# Patient Record
Sex: MALE | Race: WHITE
[De-identification: names, ages, dates, MRNs, and addresses within clinical notes are randomized per-mention and may not be internally consistent; named-entity substitution may affect disease eponyms.]

---

## 2020-10-22 ENCOUNTER — HOSPITAL ENCOUNTER (INPATIENT)
Dept: HOSPITAL 95 - ER | Age: 75
LOS: 2 days | Discharge: HOME | DRG: 242 | End: 2020-10-24
Attending: HOSPITALIST | Admitting: HOSPITALIST
Payer: OTHER GOVERNMENT

## 2020-10-22 VITALS — HEIGHT: 72.99 IN | BODY MASS INDEX: 34.19 KG/M2 | WEIGHT: 257.94 LBS

## 2020-10-22 DIAGNOSIS — E66.01: ICD-10-CM

## 2020-10-22 DIAGNOSIS — Z79.84: ICD-10-CM

## 2020-10-22 DIAGNOSIS — I50.31: ICD-10-CM

## 2020-10-22 DIAGNOSIS — Z96.651: ICD-10-CM

## 2020-10-22 DIAGNOSIS — G47.33: ICD-10-CM

## 2020-10-22 DIAGNOSIS — Z96.643: ICD-10-CM

## 2020-10-22 DIAGNOSIS — E78.5: ICD-10-CM

## 2020-10-22 DIAGNOSIS — E11.9: ICD-10-CM

## 2020-10-22 DIAGNOSIS — J44.9: ICD-10-CM

## 2020-10-22 DIAGNOSIS — I49.5: Primary | ICD-10-CM

## 2020-10-22 DIAGNOSIS — I11.0: ICD-10-CM

## 2020-10-22 DIAGNOSIS — I48.11: ICD-10-CM

## 2020-10-22 DIAGNOSIS — K21.9: ICD-10-CM

## 2020-10-22 LAB
ALBUMIN SERPL BCP-MCNC: 3.4 G/DL (ref 3.4–5)
ALBUMIN/GLOB SERPL: 1 {RATIO} (ref 0.8–1.8)
ALT SERPL W P-5'-P-CCNC: 31 U/L (ref 12–78)
ANION GAP SERPL CALCULATED.4IONS-SCNC: 5 MMOL/L (ref 6–16)
AST SERPL W P-5'-P-CCNC: 37 U/L (ref 12–37)
BASOPHILS # BLD AUTO: 0.06 K/MM3 (ref 0–0.23)
BASOPHILS NFR BLD AUTO: 1 % (ref 0–2)
BILIRUB SERPL-MCNC: 1 MG/DL (ref 0.1–1)
BUN SERPL-MCNC: 14 MG/DL (ref 8–24)
CALCIUM SERPL-MCNC: 8.7 MG/DL (ref 8.5–10.1)
CHLORIDE SERPL-SCNC: 115 MMOL/L (ref 98–108)
CO2 SERPL-SCNC: 25 MMOL/L (ref 21–32)
CREAT SERPL-MCNC: 0.85 MG/DL (ref 0.6–1.2)
DEPRECATED RDW RBC AUTO: 45.9 FL (ref 35.1–46.3)
EOSINOPHIL # BLD AUTO: 0.26 K/MM3 (ref 0–0.68)
EOSINOPHIL NFR BLD AUTO: 4 % (ref 0–6)
ERYTHROCYTE [DISTWIDTH] IN BLOOD BY AUTOMATED COUNT: 13.3 % (ref 11.7–14.2)
GLOBULIN SER CALC-MCNC: 3.4 G/DL (ref 2.2–4)
GLUCOSE SERPL-MCNC: 109 MG/DL (ref 70–99)
HCT VFR BLD AUTO: 44.9 % (ref 37–53)
HGB BLD-MCNC: 14.8 G/DL (ref 13.5–17.5)
IMM GRANULOCYTES # BLD AUTO: 0.02 K/MM3 (ref 0–0.1)
IMM GRANULOCYTES NFR BLD AUTO: 0 % (ref 0–1)
LYMPHOCYTES # BLD AUTO: 1.67 K/MM3 (ref 0.84–5.2)
LYMPHOCYTES NFR BLD AUTO: 23 % (ref 21–46)
MAGNESIUM SERPL-MCNC: 2.1 MG/DL (ref 1.6–2.4)
MCHC RBC AUTO-ENTMCNC: 33 G/DL (ref 31.5–36.5)
MCV RBC AUTO: 94 FL (ref 80–100)
MONOCYTES # BLD AUTO: 0.62 K/MM3 (ref 0.16–1.47)
MONOCYTES NFR BLD AUTO: 9 % (ref 4–13)
NEUTROPHILS # BLD AUTO: 4.64 K/MM3 (ref 1.96–9.15)
NEUTROPHILS NFR BLD AUTO: 64 % (ref 41–73)
NRBC # BLD AUTO: 0 K/MM3 (ref 0–0.02)
NRBC BLD AUTO-RTO: 0 /100 WBC (ref 0–0.2)
PLATELET # BLD AUTO: 199 K/MM3 (ref 150–400)
POTASSIUM SERPL-SCNC: 4.7 MMOL/L (ref 3.5–5.5)
PROT SERPL-MCNC: 6.8 G/DL (ref 6.4–8.2)
PROTHROMBIN TIME: 11 SEC (ref 9.7–11.5)
SODIUM SERPL-SCNC: 145 MMOL/L (ref 136–145)
TROPONIN I SERPL-MCNC: <0.015 NG/ML (ref 0–0.04)

## 2020-10-22 PROCEDURE — C1781 MESH (IMPLANTABLE): HCPCS

## 2020-10-22 PROCEDURE — A9270 NON-COVERED ITEM OR SERVICE: HCPCS

## 2020-10-22 PROCEDURE — U0003 INFECTIOUS AGENT DETECTION BY NUCLEIC ACID (DNA OR RNA); SEVERE ACUTE RESPIRATORY SYNDROME CORONAVIRUS 2 (SARS-COV-2) (CORONAVIRUS DISEASE [COVID-19]), AMPLIFIED PROBE TECHNIQUE, MAKING USE OF HIGH THROUGHPUT TECHNOLOGIES AS DESCRIBED BY CMS-2020-01-R: HCPCS

## 2020-10-22 PROCEDURE — C1898 LEAD, PMKR, OTHER THAN TRANS: HCPCS

## 2020-10-22 PROCEDURE — C1894 INTRO/SHEATH, NON-LASER: HCPCS

## 2020-10-22 PROCEDURE — C1785 PMKR, DUAL, RATE-RESP: HCPCS

## 2020-10-23 LAB
ANION GAP SERPL CALCULATED.4IONS-SCNC: 6 MMOL/L (ref 6–16)
BUN SERPL-MCNC: 17 MG/DL (ref 8–24)
CALCIUM SERPL-MCNC: 8.7 MG/DL (ref 8.5–10.1)
CHLORIDE SERPL-SCNC: 111 MMOL/L (ref 98–108)
CO2 SERPL-SCNC: 25 MMOL/L (ref 21–32)
CREAT SERPL-MCNC: 1.08 MG/DL (ref 0.6–1.2)
DEPRECATED RDW RBC AUTO: 44.8 FL (ref 35.1–46.3)
ERYTHROCYTE [DISTWIDTH] IN BLOOD BY AUTOMATED COUNT: 13.2 % (ref 11.7–14.2)
GLUCOSE SERPL-MCNC: 116 MG/DL (ref 70–99)
HCT VFR BLD AUTO: 42.7 % (ref 37–53)
HGB BLD-MCNC: 14.1 G/DL (ref 13.5–17.5)
MCHC RBC AUTO-ENTMCNC: 33 G/DL (ref 31.5–36.5)
MCV RBC AUTO: 92 FL (ref 80–100)
NRBC # BLD AUTO: 0 K/MM3 (ref 0–0.02)
NRBC BLD AUTO-RTO: 0 /100 WBC (ref 0–0.2)
PLATELET # BLD AUTO: 225 K/MM3 (ref 150–400)
POTASSIUM SERPL-SCNC: 3.9 MMOL/L (ref 3.5–5.5)
SODIUM SERPL-SCNC: 142 MMOL/L (ref 136–145)

## 2020-10-23 PROCEDURE — 02H63JZ INSERTION OF PACEMAKER LEAD INTO RIGHT ATRIUM, PERCUTANEOUS APPROACH: ICD-10-PCS | Performed by: INTERNAL MEDICINE

## 2020-10-23 PROCEDURE — 0JH806Z INSERTION OF PACEMAKER, DUAL CHAMBER INTO ABDOMEN SUBCUTANEOUS TISSUE AND FASCIA, OPEN APPROACH: ICD-10-PCS | Performed by: INTERNAL MEDICINE

## 2020-10-23 PROCEDURE — 02HK3JZ INSERTION OF PACEMAKER LEAD INTO RIGHT VENTRICLE, PERCUTANEOUS APPROACH: ICD-10-PCS | Performed by: INTERNAL MEDICINE

## 2020-10-23 NOTE — NUR
SHIFT SUMMARY:
 
PT RETURNED FROM [facility] AT APPROX 1600. DUAL CHAMBER PACEMAKER PLACED TO
RIGHT CHEST WALL, RATE OF 60 BPM, PRESSURE DRESSING IN PLACE AND C/D/I. PT
TOLERATES PROCEDURE WELL, MEDICATED PER EMAR W/TYLENOL FOR C/O HEADACHE
REPORTS IMPROVEMENT OF SYMPTOMS. PT CONSUMES 100% OF DINNER TRAY. VS HAVE BEEN
STABLE SO FAR. WILL CONTINUE TO MONITOR AND TREAT ACCORDINGLY UNTIL CHANGE OF
SHIFT.

## 2020-10-23 NOTE — NUR
SHIFT SUMMARY
PT A&O X 4; PLEASANT & COMPLIANT W/ CARE; WIFE AT BEDSIDE; VSS; AFLUTTER NOTED
ON TELE; HAS SYMPTOMATIC BRADYCARDIA EPISODES W/ HR AS LOW AS 25; PT STATES AT
TIMES HE DOES FEEL A "WARM FLUSH" OR "SOMETHING COME OVER ME"; DR. WU AT
BEDSIDE THIS AM; PT DENIES NEEDS; CALL LIGHT IN REACH; BED IN LOWEST POSITION;
WILL CONTINUE TO MONITOR CLOSELY UNTIL HAND OFF TO DAY SHIFT RN.

## 2020-10-23 NOTE — NUR
ASSUMED CARE OF PT, RECEIVED REPORT FROM [facility]. PT RESTING QUIETLY IN
BED WITH TV ON AND SPOUSE AT BEDSIDE. PT TAKES PO MEDICATION WITHOUT
DIFFICULTY. PT IS PENDING PACEMAKER PLACEMENT IN [facility] TODAY, UPDATED
ON TIMEFRAME. PT DENIES FURTHER NEEDS, CALL LIGHT WITHIN REACH.

## 2020-10-24 NOTE — NUR
pt laying in bed awake a/ox3, wife at bedside, is in good spirits this am,
states he had a good night last night, is ready to get home after having a
pacer placed yesterday. pressure dressing noted to right chest, no swelling
redness or drainage to area. v.s. stable, states just a bit of discomfort to
pacer site, but had tylenol this am. ice pack applied to area. call light in
reach.

## 2020-10-24 NOTE — NUR
shift summary
 
pt rested comfortably through night
ao
tele paced 60bpm
2lnc
c/o some discomfort where pacemaker placed - tylenol x1, sling in place
voiding to toilet
no bm
vss
 
call light within reach, bed in lowest position. will continue to monitor.

## 2020-10-24 NOTE — NUR
PT HAS BEEN DISCHARGED TO HOME AFTER PACER WAS INTERRIGATED, AND CXR DONE. DR. MELÉNDEZ SAW HIM AND CHANGED THE DRESSING. IV REMOVED INTACT, WENT OVER ALL
FOLLOW UP APPTS. AND INSTRUCTIONS. HE VERBALIZED UNDERSTANDING. NEW MEDICATION
FAXED TO [facility]. LEFT VIA WHEELCHAIR WITH NURSE AND WIFE IN ATTENDENCE.